# Patient Record
Sex: MALE | Race: OTHER | HISPANIC OR LATINO | URBAN - METROPOLITAN AREA
[De-identification: names, ages, dates, MRNs, and addresses within clinical notes are randomized per-mention and may not be internally consistent; named-entity substitution may affect disease eponyms.]

---

## 2023-12-15 ENCOUNTER — CLAIMS CREATED FROM THE CLAIM WINDOW (OUTPATIENT)
Dept: URBAN - METROPOLITAN AREA CLINIC 42 | Facility: CLINIC | Age: 43
End: 2023-12-15
Payer: OTHER GOVERNMENT

## 2023-12-15 ENCOUNTER — DASHBOARD ENCOUNTERS (OUTPATIENT)
Age: 43
End: 2023-12-15

## 2023-12-15 VITALS
DIASTOLIC BLOOD PRESSURE: 78 MMHG | SYSTOLIC BLOOD PRESSURE: 119 MMHG | RESPIRATION RATE: 20 BRPM | TEMPERATURE: 97.1 F | HEART RATE: 64 BPM | BODY MASS INDEX: 31.16 KG/M2 | HEIGHT: 65 IN | WEIGHT: 187 LBS

## 2023-12-15 DIAGNOSIS — K75.81 NASH (NONALCOHOLIC STEATOHEPATITIS): ICD-10-CM

## 2023-12-15 PROBLEM — 442685003: Status: ACTIVE | Noted: 2023-12-15

## 2023-12-15 PROCEDURE — 99204 OFFICE O/P NEW MOD 45 MIN: CPT | Performed by: INTERNAL MEDICINE

## 2023-12-15 PROCEDURE — 99244 OFF/OP CNSLTJ NEW/EST MOD 40: CPT | Performed by: INTERNAL MEDICINE

## 2023-12-15 RX ORDER — FENOFIBRIC ACID 135 MG/1
1 CAPSULE CAPSULE, DELAYED RELEASE ORAL ONCE A DAY
Status: ACTIVE | COMMUNITY

## 2023-12-15 RX ORDER — SIMVASTATIN 10 MG
1 TABLET IN THE EVENING TABLET ORAL ONCE A DAY
Status: ACTIVE | COMMUNITY

## 2023-12-15 RX ORDER — SERTRALINE 50 MG/1
1 TABLET TABLET, FILM COATED ORAL ONCE A DAY
Status: ACTIVE | COMMUNITY

## 2023-12-15 NOTE — HPI-TODAY'S VISIT:
This patient was referred by Dominique Kerr for an evaluation of fatty liver.  A copy of this will be sent to the referring provider.  The patient was found to have elevated liver enzymes on routine blood work.  US showed fatty liver.  He denies significant ETOH use but did drink heavily for 1 week prior to his last blood work.  No new meds.  No family hx of liver disease.

## 2024-01-06 LAB
ALPHA 2-MACROGLOBULINS, QN: 108
ALT (SGPT) P5P: 47
APOLIPOPROTEIN A-1: 158
AST (SGOT) P5P: 34
BILIRUBIN, TOTAL: 0.2
CHOLESTEROL, TOTAL: 187
COMMENT:: (no result)
FIBROSIS SCORE: 0.02
FIBROSIS SCORING:: (no result)
FIBROSIS STAGE: (no result)
GGT: 25
GLUCOSE, SERUM: 96
HAPTOGLOBIN: 163
INTERPRETATIONS:: (no result)
LIMITATIONS:: (no result)
Lab: (no result)
NASH GRADE: (no result)
NASH SCORE: 0.41
NASH SCORING: (no result)
STEATOSIS GRADE: (no result)
STEATOSIS SCORE: 0.54
STEATOSIS SCORING: (no result)
TRIGLYCERIDES: 226

## 2024-01-08 ENCOUNTER — TELEPHONE ENCOUNTER (OUTPATIENT)
Dept: URBAN - METROPOLITAN AREA CLINIC 42 | Facility: CLINIC | Age: 44
End: 2024-01-08